# Patient Record
Sex: FEMALE | URBAN - METROPOLITAN AREA
[De-identification: names, ages, dates, MRNs, and addresses within clinical notes are randomized per-mention and may not be internally consistent; named-entity substitution may affect disease eponyms.]

---

## 2024-09-23 ENCOUNTER — APPOINTMENT (OUTPATIENT)
Dept: PRIMARY CARE | Facility: CLINIC | Age: 44
End: 2024-09-23

## 2024-09-23 DIAGNOSIS — Z71.9 HEALTH EDUCATION/COUNSELING: Primary | ICD-10-CM

## 2024-09-23 DIAGNOSIS — Z00.00 HEALTHCARE MAINTENANCE: Primary | ICD-10-CM

## 2024-10-10 ENCOUNTER — LAB (OUTPATIENT)
Dept: LAB | Facility: LAB | Age: 44
End: 2024-10-10
Payer: COMMERCIAL

## 2024-10-10 DIAGNOSIS — Z00.00 HEALTHCARE MAINTENANCE: ICD-10-CM

## 2024-10-10 LAB
25(OH)D3 SERPL-MCNC: 31 NG/ML (ref 30–100)
ALBUMIN SERPL BCP-MCNC: 4.2 G/DL (ref 3.4–5)
ALP SERPL-CCNC: 40 U/L (ref 33–110)
ALT SERPL W P-5'-P-CCNC: 11 U/L (ref 7–45)
ANION GAP SERPL CALC-SCNC: 14 MMOL/L (ref 10–20)
APPEARANCE UR: ABNORMAL
AST SERPL W P-5'-P-CCNC: 13 U/L (ref 9–39)
BACTERIA #/AREA URNS AUTO: ABNORMAL /HPF
BASOPHILS # BLD AUTO: 0.03 X10*3/UL (ref 0–0.1)
BASOPHILS NFR BLD AUTO: 0.8 %
BILIRUB SERPL-MCNC: 0.7 MG/DL (ref 0–1.2)
BILIRUB UR STRIP.AUTO-MCNC: NEGATIVE MG/DL
BUN SERPL-MCNC: 15 MG/DL (ref 6–23)
CALCIUM SERPL-MCNC: 9.1 MG/DL (ref 8.6–10.6)
CHLORIDE SERPL-SCNC: 106 MMOL/L (ref 98–107)
CHOLEST SERPL-MCNC: 226 MG/DL (ref 0–199)
CHOLESTEROL/HDL RATIO: 4.2
CO2 SERPL-SCNC: 27 MMOL/L (ref 21–32)
COLOR UR: ABNORMAL
CREAT SERPL-MCNC: 0.64 MG/DL (ref 0.5–1.05)
CRP SERPL HS-MCNC: 0.8 MG/L
EGFRCR SERPLBLD CKD-EPI 2021: >90 ML/MIN/1.73M*2
EOSINOPHIL # BLD AUTO: 0.14 X10*3/UL (ref 0–0.7)
EOSINOPHIL NFR BLD AUTO: 3.5 %
ERYTHROCYTE [DISTWIDTH] IN BLOOD BY AUTOMATED COUNT: 11.9 % (ref 11.5–14.5)
EST. AVERAGE GLUCOSE BLD GHB EST-MCNC: 85 MG/DL
GLUCOSE SERPL-MCNC: 93 MG/DL (ref 74–99)
GLUCOSE UR STRIP.AUTO-MCNC: NORMAL MG/DL
HBA1C MFR BLD: 4.6 %
HCT VFR BLD AUTO: 41.2 % (ref 36–46)
HDLC SERPL-MCNC: 53.6 MG/DL
HGB BLD-MCNC: 13.7 G/DL (ref 12–16)
HOLD SPECIMEN: NORMAL
IMM GRANULOCYTES # BLD AUTO: 0.01 X10*3/UL (ref 0–0.7)
IMM GRANULOCYTES NFR BLD AUTO: 0.3 % (ref 0–0.9)
KETONES UR STRIP.AUTO-MCNC: NEGATIVE MG/DL
LDLC SERPL CALC-MCNC: 140 MG/DL
LEUKOCYTE ESTERASE UR QL STRIP.AUTO: ABNORMAL
LYMPHOCYTES # BLD AUTO: 1.22 X10*3/UL (ref 1.2–4.8)
LYMPHOCYTES NFR BLD AUTO: 30.8 %
MCH RBC QN AUTO: 30.5 PG (ref 26–34)
MCHC RBC AUTO-ENTMCNC: 33.3 G/DL (ref 32–36)
MCV RBC AUTO: 92 FL (ref 80–100)
MONOCYTES # BLD AUTO: 0.28 X10*3/UL (ref 0.1–1)
MONOCYTES NFR BLD AUTO: 7.1 %
MUCOUS THREADS #/AREA URNS AUTO: ABNORMAL /LPF
NEUTROPHILS # BLD AUTO: 2.28 X10*3/UL (ref 1.2–7.7)
NEUTROPHILS NFR BLD AUTO: 57.5 %
NITRITE UR QL STRIP.AUTO: NEGATIVE
NON HDL CHOLESTEROL: 172 MG/DL (ref 0–149)
NRBC BLD-RTO: 0 /100 WBCS (ref 0–0)
PH UR STRIP.AUTO: 7 [PH]
PLATELET # BLD AUTO: 210 X10*3/UL (ref 150–450)
POTASSIUM SERPL-SCNC: 4.6 MMOL/L (ref 3.5–5.3)
PROT SERPL-MCNC: 6.7 G/DL (ref 6.4–8.2)
PROT UR STRIP.AUTO-MCNC: NEGATIVE MG/DL
RBC # BLD AUTO: 4.49 X10*6/UL (ref 4–5.2)
RBC # UR STRIP.AUTO: ABNORMAL /UL
RBC #/AREA URNS AUTO: ABNORMAL /HPF
SODIUM SERPL-SCNC: 142 MMOL/L (ref 136–145)
SP GR UR STRIP.AUTO: 1.01
SQUAMOUS #/AREA URNS AUTO: ABNORMAL /HPF
TRIGL SERPL-MCNC: 160 MG/DL (ref 0–149)
TSH SERPL-ACNC: 3.44 MIU/L (ref 0.44–3.98)
UROBILINOGEN UR STRIP.AUTO-MCNC: NORMAL MG/DL
VLDL: 32 MG/DL (ref 0–40)
WBC # BLD AUTO: 4 X10*3/UL (ref 4.4–11.3)
WBC #/AREA URNS AUTO: ABNORMAL /HPF

## 2024-10-10 PROCEDURE — 86141 C-REACTIVE PROTEIN HS: CPT

## 2024-10-10 PROCEDURE — 85025 COMPLETE CBC W/AUTO DIFF WBC: CPT

## 2024-10-10 PROCEDURE — 80053 COMPREHEN METABOLIC PANEL: CPT

## 2024-10-10 PROCEDURE — 80061 LIPID PANEL: CPT

## 2024-10-10 PROCEDURE — 84443 ASSAY THYROID STIM HORMONE: CPT

## 2024-10-10 PROCEDURE — 81001 URINALYSIS AUTO W/SCOPE: CPT

## 2024-10-10 PROCEDURE — 82306 VITAMIN D 25 HYDROXY: CPT

## 2024-10-10 PROCEDURE — 36415 COLL VENOUS BLD VENIPUNCTURE: CPT

## 2024-10-10 PROCEDURE — 83036 HEMOGLOBIN GLYCOSYLATED A1C: CPT

## 2024-10-10 PROCEDURE — 87086 URINE CULTURE/COLONY COUNT: CPT

## 2024-10-11 LAB — BACTERIA UR CULT: NORMAL

## 2024-10-14 PROBLEM — F33.41 RECURRENT MAJOR DEPRESSION IN PARTIAL REMISSION (CMS-HCC): Status: ACTIVE | Noted: 2024-10-14

## 2024-10-14 PROBLEM — K90.0 CELIAC DISEASE (HHS-HCC): Status: ACTIVE | Noted: 2024-10-14

## 2024-10-14 RX ORDER — CITALOPRAM 20 MG/1
20 TABLET, FILM COATED ORAL DAILY
COMMUNITY

## 2024-10-14 ASSESSMENT — PROMIS GLOBAL HEALTH SCALE
EMOTIONAL_PROBLEMS: RARELY
RATE_PHYSICAL_HEALTH: FAIR
RATE_GENERAL_HEALTH: FAIR
RATE_AVERAGE_FATIGUE: SEVERE
RATE_MENTAL_HEALTH: VERY GOOD
RATE_QUALITY_OF_LIFE: GOOD
RATE_AVERAGE_PAIN: 4
RATE_SOCIAL_SATISFACTION: VERY GOOD
CARRYOUT_SOCIAL_ACTIVITIES: VERY GOOD
CARRYOUT_PHYSICAL_ACTIVITIES: A LITTLE
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
## 2024-10-15 ENCOUNTER — TELEPHONE (OUTPATIENT)
Dept: PRIMARY CARE | Facility: CLINIC | Age: 44
End: 2024-10-15

## 2024-10-15 ENCOUNTER — APPOINTMENT (OUTPATIENT)
Dept: PRIMARY CARE | Facility: CLINIC | Age: 44
End: 2024-10-15
Payer: COMMERCIAL

## 2024-10-15 VITALS
OXYGEN SATURATION: 97 % | WEIGHT: 163 LBS | SYSTOLIC BLOOD PRESSURE: 104 MMHG | HEIGHT: 63 IN | HEART RATE: 92 BPM | TEMPERATURE: 97.7 F | DIASTOLIC BLOOD PRESSURE: 78 MMHG | BODY MASS INDEX: 28.88 KG/M2

## 2024-10-15 DIAGNOSIS — N95.1 PERIMENOPAUSE: ICD-10-CM

## 2024-10-15 DIAGNOSIS — Z00.00 ENCOUNTER FOR WELLNESS EXAMINATION IN ADULT: Primary | ICD-10-CM

## 2024-10-15 DIAGNOSIS — F33.41 RECURRENT MAJOR DEPRESSION IN PARTIAL REMISSION (CMS-HCC): ICD-10-CM

## 2024-10-15 DIAGNOSIS — E78.5 HYPERLIPIDEMIA, UNSPECIFIED HYPERLIPIDEMIA TYPE: ICD-10-CM

## 2024-10-15 DIAGNOSIS — E55.9 VITAMIN D DEFICIENCY: ICD-10-CM

## 2024-10-15 DIAGNOSIS — K90.41 GLUTEN INTOLERANCE: ICD-10-CM

## 2024-10-15 DIAGNOSIS — G47.419 PRIMARY NARCOLEPSY WITHOUT CATAPLEXY (HHS-HCC): ICD-10-CM

## 2024-10-15 DIAGNOSIS — R53.83 OTHER FATIGUE: ICD-10-CM

## 2024-10-15 DIAGNOSIS — L30.9 ECZEMA, UNSPECIFIED TYPE: ICD-10-CM

## 2024-10-15 RX ORDER — ONDANSETRON 8 MG/1
8 TABLET, ORALLY DISINTEGRATING ORAL EVERY 8 HOURS PRN
Qty: 20 TABLET | Refills: 0 | Status: SHIPPED | OUTPATIENT
Start: 2024-10-15 | End: 2024-10-22

## 2024-10-15 RX ORDER — TRIAMCINOLONE ACETONIDE 55 UG/1
1 SPRAY, METERED NASAL DAILY
COMMUNITY

## 2024-10-15 RX ORDER — BUPROPION HYDROCHLORIDE 150 MG/1
150 TABLET ORAL EVERY MORNING
Qty: 30 TABLET | Refills: 1 | Status: SHIPPED | OUTPATIENT
Start: 2024-10-15 | End: 2024-12-14

## 2024-10-15 RX ORDER — MILK THISTLE 150 MG
2 CAPSULE ORAL DAILY
COMMUNITY

## 2024-10-15 RX ORDER — VIT C/E/ZN/COPPR/LUTEIN/ZEAXAN 250MG-90MG
125 CAPSULE ORAL DAILY
COMMUNITY

## 2024-10-15 RX ORDER — AZELASTINE 1 MG/ML
1 SPRAY, METERED NASAL DAILY
COMMUNITY
Start: 2023-02-24

## 2024-10-15 RX ORDER — OMEPRAZOLE 20 MG/1
20 CAPSULE, DELAYED RELEASE ORAL DAILY
Qty: 30 CAPSULE | Refills: 11 | Status: SHIPPED | OUTPATIENT
Start: 2024-10-15 | End: 2025-10-15

## 2024-10-15 RX ORDER — HYDROXYZINE HYDROCHLORIDE 50 MG/1
50 TABLET, FILM COATED ORAL AS NEEDED
COMMUNITY
Start: 2023-02-24

## 2024-10-15 NOTE — PATIENT INSTRUCTIONS
Commit to at least 10 minutes daily of mild exercise. Walking, lifting etc  Wellbutrin in am  Prilosec daily  Zofran as needed for nausea  I will call dr. Ghotra   Follow up in one month.  My goal is sto see if you feel less brain fog, more energy etc.

## 2024-10-15 NOTE — TELEPHONE ENCOUNTER
Dr. Carol Ghotra [phone]    Patient says sometimes it is hard to reach her by phone so this is her email [email or web address]

## 2024-10-21 NOTE — TELEPHONE ENCOUNTER
Email chain from Psychology.       Gerardo Garcia,    I just finished speaking with Ruthie and received consent to talk with you. I thought I would start with a simple reply to your email and if you would like to talk further, let me know, and we can follow up with a phone call if you like.  I think the most important thing to know is she is a good  of her experience and her history. She has really come a long way in many years of psychotherapy and has learned many good ways to manage her mood and anxiety symptoms and has worked through a considerable amount of her previous trauma.       Here are some things we continue to work on:    Acceptance of what she cannot change or control in life and related to her physical challenges, equanimity     Self compassion practice and being less hard on herself/more gentle toward herself    Her perfectionism and self judgment    Her tendency toward rumination and obsessive thinking habits    Expression of strong emotion, especially the vulnerable feelings of grief and fear which can be covered over by excessive anxiety and anger.    Self-care in many ways      I hope this is helpful to you. Please let me know if you'd like to talk further…    Carol [facility]   Psychologist   Sent from my [place]      On Oct 15, 2024, at 4:57?PM, Chicho Hay <[email or web address]> wrote:  ?   Gerardo Medina,  I'm a  Internist at (was at the Clinic for 26 yrs), and just started here in July. Vivien started to see me and I asked her if I could reach out to you. She feels you have helped her a lot and wanted to get your input. I saw her for the first time today and she has significant fatigue , that as I can tell from records etc, its been for at least 20 years. We had a great visit and I feel its complicated , but I feel a lot of her issues are still coming from the psychological side of things.  Here labs looked fine. I am getting an echo to make sure her symptoms are  not from an underlying heart issue. I don't expect to find anything. Along with checking in with her, here are the instructions I gave her:  Commit to at least 10 minutes daily of mild exercise. Walking, lifting etc  Wellbutrin 150 xl.  in am( I thought this could give her a little energy in the am)  Prilosec daily  Zofran as needed for nausea  I will call dr. Ghotra   Follow up in one month.  My goal is to see if you feel less brain fog, more energy etc.     Please feel free to send me any general thoughts you may have or we can do a brief call if that would be easier. Just trying to get more insight into her hx. She gave me a lot of stressors due to the issue with adopted daughter and stress from 's family etc.      Take care!  Jose

## 2024-10-30 ENCOUNTER — TELEPHONE (OUTPATIENT)
Dept: PRIMARY CARE | Facility: CLINIC | Age: 44
End: 2024-10-30
Payer: COMMERCIAL

## 2024-10-30 DIAGNOSIS — R53.83 OTHER FATIGUE: Primary | ICD-10-CM

## 2024-11-06 ENCOUNTER — HOSPITAL ENCOUNTER (OUTPATIENT)
Dept: CARDIOLOGY | Facility: CLINIC | Age: 44
Discharge: HOME | End: 2024-11-06
Payer: COMMERCIAL

## 2024-11-06 DIAGNOSIS — F33.41 RECURRENT MAJOR DEPRESSION IN PARTIAL REMISSION (CMS-HCC): ICD-10-CM

## 2024-11-06 DIAGNOSIS — R53.83 OTHER FATIGUE: ICD-10-CM

## 2024-11-06 LAB
AORTIC VALVE PEAK VELOCITY: 1.46 M/S
AV PEAK GRADIENT: 9 MMHG
AVA (PEAK VEL): 2.8 CM2
EJECTION FRACTION APICAL 4 CHAMBER: 64.4
EJECTION FRACTION: 65 %
LEFT ATRIUM VOLUME AREA LENGTH INDEX BSA: 15.4 ML/M2
LEFT VENTRICLE INTERNAL DIMENSION DIASTOLE: 4.29 CM (ref 3.5–6)
LEFT VENTRICULAR OUTFLOW TRACT DIAMETER: 2.07 CM
MITRAL VALVE E/A RATIO: 0.67
RIGHT VENTRICLE FREE WALL PEAK S': 15 CM/S
RIGHT VENTRICLE PEAK SYSTOLIC PRESSURE: 23.3 MMHG
TRICUSPID ANNULAR PLANE SYSTOLIC EXCURSION: 2 CM

## 2024-11-06 PROCEDURE — 93306 TTE W/DOPPLER COMPLETE: CPT | Performed by: INTERNAL MEDICINE

## 2024-11-06 PROCEDURE — 93306 TTE W/DOPPLER COMPLETE: CPT

## 2024-11-06 RX ORDER — BUPROPION HYDROCHLORIDE 150 MG/1
150 TABLET ORAL EVERY MORNING
Qty: 90 TABLET | Refills: 1 | Status: SHIPPED | OUTPATIENT
Start: 2024-11-06 | End: 2025-05-05

## 2024-11-15 ENCOUNTER — APPOINTMENT (OUTPATIENT)
Dept: PRIMARY CARE | Facility: CLINIC | Age: 44
End: 2024-11-15
Payer: COMMERCIAL

## 2024-11-15 VITALS
OXYGEN SATURATION: 97 % | HEART RATE: 89 BPM | TEMPERATURE: 98.1 F | DIASTOLIC BLOOD PRESSURE: 62 MMHG | SYSTOLIC BLOOD PRESSURE: 98 MMHG

## 2024-11-15 DIAGNOSIS — R53.83 OTHER FATIGUE: ICD-10-CM

## 2024-11-15 DIAGNOSIS — R11.0 NAUSEA: Primary | ICD-10-CM

## 2024-11-15 DIAGNOSIS — F33.41 RECURRENT MAJOR DEPRESSION IN PARTIAL REMISSION (CMS-HCC): ICD-10-CM

## 2024-11-15 PROCEDURE — 99204 OFFICE O/P NEW MOD 45 MIN: CPT | Performed by: INTERNAL MEDICINE

## 2024-11-15 RX ORDER — BUPROPION HYDROCHLORIDE 300 MG/1
300 TABLET ORAL EVERY MORNING
Qty: 90 TABLET | Refills: 3 | Status: SHIPPED | OUTPATIENT
Start: 2024-11-15 | End: 2025-11-10

## 2024-11-15 NOTE — PROGRESS NOTES
Subjective   Patient ID: Vivien Sheehan is a 44 y.o. female who presents for Follow-up.    HPI   Fu overwhelming fatigue.   I feel patient has multiple reasons for this chronic fatigue. I feel that she has had years of moderately treated depression with no changes in meds etc.   Commit to at least 10 minutes daily of mild exercise. Walking, lifting etc. She has started walking with  and walking dogs. Much better than where it was. Doing yoga.   Wellbutrin in am 150mg. Started at last vsiit. No anxiousness with the med. Election bothered her. Has been on celexa. On the whole feels she may have a little more energy, ex. Went to [employer] and walked [place] hill and kept going for 45 min to an hour. Went to dinner that night.   Feels major stressors on her body will cause the fatigue. Covid, listeria, holiday joy last year.   Prilosec daily  Zofran as needed for nausea gives her constipation. Nausea daily. Night after dinner. Feels its worse since bad thicknesses. Nausea 20 yrs.   I will call dr. Ghotra   Follow up in one month.  My goal is sto see if you feel less brain fog, more energy etc.  7. Has womens health visit.   - Transthoracic Echo (TTE) Complete; Future    Dr ghotra responded with:  Here are some things we continue to work on:    Acceptance of what she cannot change or control in life and related to her physical challenges, equanimity     Self compassion practice and being less hard on herself/more gentle toward herself    Her perfectionism and self judgment    Her tendency toward rumination and obsessive thinking habits    Expression of strong emotion, especially the vulnerable feelings of grief and fear which can be covered over by excessive anxiety and anger.    Self-care in many ways      Review of Systems    Objective   BP 98/62 (BP Location: Right arm, Patient Position: Sitting)   Pulse 89   Temp 36.7 °C (98.1 °F)   SpO2 97%     Physical Exam    Assessment/Plan   1. Nausea  (Primary)  Chronic issue. Would like to see gi. May need scope. Will check stool for h pylori.   - H. pylori antigen, stool; Future  - Referral to Gastroenterology; Future    2. Recurrent major depression in partial remission (CMS-HCC)  Will increase the wellbutrin to 300 and continue celexa. We will continue to look for improvements in fatigue, stamina and day to day struggles she has had. Appreciated input from her psychologist , who feels that she     3. Other fatigue  Ties in with above.

## 2024-12-11 ENCOUNTER — APPOINTMENT (OUTPATIENT)
Dept: CARDIOLOGY | Facility: HOSPITAL | Age: 44
End: 2024-12-11
Payer: COMMERCIAL

## 2024-12-11 ENCOUNTER — HOSPITAL ENCOUNTER (EMERGENCY)
Facility: HOSPITAL | Age: 44
Discharge: HOME | End: 2024-12-11
Attending: EMERGENCY MEDICINE
Payer: COMMERCIAL

## 2024-12-11 ENCOUNTER — APPOINTMENT (OUTPATIENT)
Dept: RADIOLOGY | Facility: HOSPITAL | Age: 44
End: 2024-12-11
Payer: COMMERCIAL

## 2024-12-11 VITALS
WEIGHT: 150 LBS | SYSTOLIC BLOOD PRESSURE: 119 MMHG | HEART RATE: 86 BPM | DIASTOLIC BLOOD PRESSURE: 83 MMHG | TEMPERATURE: 98.1 F | BODY MASS INDEX: 27.6 KG/M2 | HEIGHT: 62 IN | OXYGEN SATURATION: 98 % | RESPIRATION RATE: 18 BRPM

## 2024-12-11 DIAGNOSIS — J98.01 ACUTE BRONCHOSPASM: Primary | ICD-10-CM

## 2024-12-11 LAB
ALBUMIN SERPL BCP-MCNC: 3.8 G/DL (ref 3.4–5)
ALP SERPL-CCNC: 49 U/L (ref 33–110)
ALT SERPL W P-5'-P-CCNC: 10 U/L (ref 7–45)
ANION GAP SERPL CALC-SCNC: 12 MMOL/L (ref 10–20)
AST SERPL W P-5'-P-CCNC: 13 U/L (ref 9–39)
BASOPHILS # BLD AUTO: 0.02 X10*3/UL (ref 0–0.1)
BASOPHILS NFR BLD AUTO: 0.4 %
BILIRUB SERPL-MCNC: 0.4 MG/DL (ref 0–1.2)
BUN SERPL-MCNC: 16 MG/DL (ref 6–23)
CALCIUM SERPL-MCNC: 8.2 MG/DL (ref 8.6–10.3)
CARDIAC TROPONIN I PNL SERPL HS: <3 NG/L (ref 0–13)
CHLORIDE SERPL-SCNC: 104 MMOL/L (ref 98–107)
CO2 SERPL-SCNC: 23 MMOL/L (ref 21–32)
CREAT SERPL-MCNC: 0.73 MG/DL (ref 0.5–1.05)
EGFRCR SERPLBLD CKD-EPI 2021: >90 ML/MIN/1.73M*2
EOSINOPHIL # BLD AUTO: 0.06 X10*3/UL (ref 0–0.7)
EOSINOPHIL NFR BLD AUTO: 1.1 %
ERYTHROCYTE [DISTWIDTH] IN BLOOD BY AUTOMATED COUNT: 12.4 % (ref 11.5–14.5)
GLUCOSE SERPL-MCNC: 101 MG/DL (ref 74–99)
HCT VFR BLD AUTO: 36 % (ref 36–46)
HGB BLD-MCNC: 12.1 G/DL (ref 12–16)
IMM GRANULOCYTES # BLD AUTO: 0.02 X10*3/UL (ref 0–0.7)
IMM GRANULOCYTES NFR BLD AUTO: 0.4 % (ref 0–0.9)
LYMPHOCYTES # BLD AUTO: 0.41 X10*3/UL (ref 1.2–4.8)
LYMPHOCYTES NFR BLD AUTO: 7.4 %
MCH RBC QN AUTO: 29.8 PG (ref 26–34)
MCHC RBC AUTO-ENTMCNC: 33.6 G/DL (ref 32–36)
MCV RBC AUTO: 89 FL (ref 80–100)
MONOCYTES # BLD AUTO: 0.54 X10*3/UL (ref 0.1–1)
MONOCYTES NFR BLD AUTO: 9.7 %
NEUTROPHILS # BLD AUTO: 4.49 X10*3/UL (ref 1.2–7.7)
NEUTROPHILS NFR BLD AUTO: 81 %
NRBC BLD-RTO: 0 /100 WBCS (ref 0–0)
PLATELET # BLD AUTO: 179 X10*3/UL (ref 150–450)
POTASSIUM SERPL-SCNC: 3.5 MMOL/L (ref 3.5–5.3)
PROT SERPL-MCNC: 6.1 G/DL (ref 6.4–8.2)
RBC # BLD AUTO: 4.06 X10*6/UL (ref 4–5.2)
S PYO DNA THROAT QL NAA+PROBE: NOT DETECTED
SARS-COV-2 RNA RESP QL NAA+PROBE: NOT DETECTED
SODIUM SERPL-SCNC: 135 MMOL/L (ref 136–145)
WBC # BLD AUTO: 5.5 X10*3/UL (ref 4.4–11.3)

## 2024-12-11 PROCEDURE — 96374 THER/PROPH/DIAG INJ IV PUSH: CPT

## 2024-12-11 PROCEDURE — 87635 SARS-COV-2 COVID-19 AMP PRB: CPT | Performed by: PHYSICIAN ASSISTANT

## 2024-12-11 PROCEDURE — 84075 ASSAY ALKALINE PHOSPHATASE: CPT | Performed by: PHYSICIAN ASSISTANT

## 2024-12-11 PROCEDURE — 85025 COMPLETE CBC W/AUTO DIFF WBC: CPT | Performed by: PHYSICIAN ASSISTANT

## 2024-12-11 PROCEDURE — 36415 COLL VENOUS BLD VENIPUNCTURE: CPT | Performed by: PHYSICIAN ASSISTANT

## 2024-12-11 PROCEDURE — 71275 CT ANGIOGRAPHY CHEST: CPT

## 2024-12-11 PROCEDURE — 2500000002 HC RX 250 W HCPCS SELF ADMINISTERED DRUGS (ALT 637 FOR MEDICARE OP, ALT 636 FOR OP/ED): Performed by: PHYSICIAN ASSISTANT

## 2024-12-11 PROCEDURE — 70491 CT SOFT TISSUE NECK W/DYE: CPT

## 2024-12-11 PROCEDURE — 87651 STREP A DNA AMP PROBE: CPT | Performed by: PHYSICIAN ASSISTANT

## 2024-12-11 PROCEDURE — 70491 CT SOFT TISSUE NECK W/DYE: CPT | Performed by: RADIOLOGY

## 2024-12-11 PROCEDURE — 94640 AIRWAY INHALATION TREATMENT: CPT

## 2024-12-11 PROCEDURE — 2550000001 HC RX 255 CONTRASTS: Performed by: PHYSICIAN ASSISTANT

## 2024-12-11 PROCEDURE — 71275 CT ANGIOGRAPHY CHEST: CPT | Performed by: RADIOLOGY

## 2024-12-11 PROCEDURE — 84484 ASSAY OF TROPONIN QUANT: CPT | Performed by: PHYSICIAN ASSISTANT

## 2024-12-11 PROCEDURE — 2500000004 HC RX 250 GENERAL PHARMACY W/ HCPCS (ALT 636 FOR OP/ED): Performed by: PHYSICIAN ASSISTANT

## 2024-12-11 PROCEDURE — 99285 EMERGENCY DEPT VISIT HI MDM: CPT | Mod: 25 | Performed by: EMERGENCY MEDICINE

## 2024-12-11 PROCEDURE — 93005 ELECTROCARDIOGRAM TRACING: CPT

## 2024-12-11 RX ORDER — BENZONATATE 200 MG/1
200 CAPSULE ORAL 3 TIMES DAILY PRN
Qty: 20 CAPSULE | Refills: 0 | Status: SHIPPED | OUTPATIENT
Start: 2024-12-11 | End: 2024-12-18

## 2024-12-11 RX ORDER — DEXAMETHASONE SODIUM PHOSPHATE 10 MG/ML
10 INJECTION INTRAMUSCULAR; INTRAVENOUS ONCE
Status: COMPLETED | OUTPATIENT
Start: 2024-12-11 | End: 2024-12-11

## 2024-12-11 RX ORDER — IPRATROPIUM BROMIDE AND ALBUTEROL SULFATE 2.5; .5 MG/3ML; MG/3ML
3 SOLUTION RESPIRATORY (INHALATION) ONCE
Status: COMPLETED | OUTPATIENT
Start: 2024-12-11 | End: 2024-12-11

## 2024-12-11 RX ORDER — ALBUTEROL SULFATE 90 UG/1
2 INHALANT RESPIRATORY (INHALATION) EVERY 4 HOURS PRN
Qty: 18 G | Refills: 0 | Status: SHIPPED | OUTPATIENT
Start: 2024-12-11 | End: 2025-01-10

## 2024-12-11 ASSESSMENT — COLUMBIA-SUICIDE SEVERITY RATING SCALE - C-SSRS
2. HAVE YOU ACTUALLY HAD ANY THOUGHTS OF KILLING YOURSELF?: NO
6. HAVE YOU EVER DONE ANYTHING, STARTED TO DO ANYTHING, OR PREPARED TO DO ANYTHING TO END YOUR LIFE?: NO
1. IN THE PAST MONTH, HAVE YOU WISHED YOU WERE DEAD OR WISHED YOU COULD GO TO SLEEP AND NOT WAKE UP?: NO

## 2024-12-11 ASSESSMENT — PAIN DESCRIPTION - LOCATION: LOCATION: THROAT

## 2024-12-11 ASSESSMENT — PAIN - FUNCTIONAL ASSESSMENT: PAIN_FUNCTIONAL_ASSESSMENT: 0-10

## 2024-12-11 ASSESSMENT — PAIN SCALES - GENERAL: PAINLEVEL_OUTOF10: 5 - MODERATE PAIN

## 2024-12-11 NOTE — ED PROVIDER NOTES
HPI   Chief Complaint   Patient presents with    Cough     Choked on saliva last night and has had severe cough through today. Hx of asthma as teenager and difficulty swallowing x2 weeks. Pt a/ox4 VSS. Managing secretions        44y old female with a complaint of choking on her spit and coughing. Nothing makes her better or worse, no fever or or chills. Has some myalgias from frequently coughing. Admits she is on wellbutrin and questions if the medication is causing her to cough. Had asthma as a child but not since. States she has no chest or pleuritic pain. Doesn't feel like she became ill.               Patient History   History reviewed. No pertinent past medical history.  Past Surgical History:   Procedure Laterality Date    BREAST BIOPSY N/A 2012    benign    COLPOSCOPY  2003    KNEE  1998     Family History   Problem Relation Name Age of Onset    Breast cancer Mother      Endometriosis Sister      Breast cancer Maternal Grandmother      Hypertension Maternal Grandfather      Ovarian cancer Paternal Grandmother      Pancreatic cancer Father's Brother       Social History     Tobacco Use    Smoking status: Never    Smokeless tobacco: Never   Substance Use Topics    Alcohol use: Yes     Alcohol/week: 2.0 standard drinks of alcohol     Types: 2 Standard drinks or equivalent per week    Drug use: Not on file       Physical Exam   ED Triage Vitals   Temperature Heart Rate Respirations BP   12/11/24 0012 12/11/24 0012 12/11/24 0012 12/11/24 0012   36.7 °C (98.1 °F) 96 20 128/77      Pulse Ox Temp Source Heart Rate Source Patient Position   12/11/24 0012 12/11/24 0012 12/11/24 0039 12/11/24 0012   98 % Temporal Monitor Sitting      BP Location FiO2 (%)     12/11/24 0012 --     Left arm        Physical Exam  Vitals and nursing note reviewed.   Constitutional:       General: She is not in acute distress.     Appearance: Normal appearance. She is normal weight. She is not ill-appearing, toxic-appearing or diaphoretic.    HENT:      Head: Normocephalic and atraumatic.      Right Ear: External ear normal.      Left Ear: External ear normal.      Nose: Nose normal.      Mouth/Throat:      Mouth: Mucous membranes are moist.   Eyes:      Extraocular Movements: Extraocular movements intact.      Conjunctiva/sclera: Conjunctivae normal.      Pupils: Pupils are equal, round, and reactive to light.   Cardiovascular:      Rate and Rhythm: Normal rate and regular rhythm.      Heart sounds: No murmur heard.  Pulmonary:      Effort: Pulmonary effort is normal. No respiratory distress.      Breath sounds: No stridor. No wheezing, rhonchi or rales.   Abdominal:      General: There is no distension.   Musculoskeletal:         General: No swelling, tenderness, deformity or signs of injury.      Cervical back: Normal range of motion. No rigidity or tenderness.   Skin:     General: Skin is warm.      Capillary Refill: Capillary refill takes less than 2 seconds.      Coloration: Skin is not jaundiced.      Findings: No bruising or rash.   Neurological:      General: No focal deficit present.      Mental Status: She is alert and oriented to person, place, and time. Mental status is at baseline.   Psychiatric:         Mood and Affect: Mood normal.         Behavior: Behavior normal.         Thought Content: Thought content normal.         Judgment: Judgment normal.           ED Course & MDM   ED Course as of 12/11/24 0406   Wed Dec 11, 2024   0201 EKG interpreted by myself.  Normal sinus rhythm at a rate of 89 bpm.  Normal intervals.  Normal axis.  No signs of acute ischemia.   [EF]      ED Course User Index  [EF] Jake Maier MD         Diagnoses as of 12/11/24 0406   Acute bronchospasm                 No data recorded     Megan Coma Scale Score: 15 (12/11/24 0013 : Darryl Ruiz RN)                           Medical Decision Making  MEDICAL DECISION MAKING   Number and Complexity of Problems  Differential Diagnosis: bronchospasm, covid, acs,  pe, retropharyngeal abscess    MDM Data  External documents reviewed: message to pcp questioning if bronchospasm fromp  Discussed with: ed attending    Treatment and Disposition  Labs Reviewed  CBC WITH AUTO DIFFERENTIAL - Abnormal     WBC                           5.5                    nRBC                          0.0                    RBC                           4.06                   Hemoglobin                    12.1                   Hematocrit                    36.0                   MCV                           89                     MCH                           29.8                   MCHC                          33.6                   RDW                           12.4                   Platelets                     179                    Neutrophils %                 81.0                   Immature Granulocytes %, Automated   0.4                    Lymphocytes %                 7.4                    Monocytes %                   9.7                    Eosinophils %                 1.1                    Basophils %                   0.4                    Neutrophils Absolute          4.49                   Immature Granulocytes Absolute, Au*   0.02                   Lymphocytes Absolute          0.41 (*)               Monocytes Absolute            0.54                   Eosinophils Absolute          0.06                   Basophils Absolute            0.02                COMPREHENSIVE METABOLIC PANEL - Abnormal     Glucose                       101 (*)                Sodium                        135 (*)                Potassium                     3.5                    Chloride                      104                    Bicarbonate                   23                     Anion Gap                     12                     Urea Nitrogen                 16                     Creatinine                    0.73                   eGFR                          >90                    Calcium                        8.2 (*)                Albumin                       3.8                    Alkaline Phosphatase          49                     Total Protein                 6.1 (*)                AST                           13                     Bilirubin, Total              0.4                    ALT                           10                  GROUP A STREPTOCOCCUS, PCR - Normal     Group A Strep PCR                                 SARS-COV-2 PCR - Normal     Coronavirus 2019, PCR                                  Narrative: This assay has received FDA Emergency Use Authorization (EUA) and is only authorized for the duration of time that circumstances exist to justify the authorization of the emergency use of in vitro diagnostic tests for the detection of SARS-CoV-2 virus and/or diagnosis of COVID-19 infection under section 564(b)(1) of the Act, 21 U.S.C. 360bbb-3(b)(1). This assay is an in vitro diagnostic nucleic acid amplification test for the qualitative detection of SARS-CoV-2 from nasopharyngeal specimens and has been validated for use at [facility]. Negative results do not preclude COVID-19 infections and should not be used as the sole basis for diagnosis, treatment, or other management decisions.                  TROPONIN I, HIGH SENSITIVITY - Normal   CT angio chest for pulmonary embolism   Final Result    1. No evidence of pulmonary emboli.    2. No acute findings at the soft tissues of the neck.                MACRO:    None.          Signed by: Geovanna Asencio 12/11/2024 3:31 AM    Dictation workstation:   [id number]     CT soft tissue neck w IV contrast   Final Result    1. No evidence of pulmonary emboli.    2. No acute findings at the soft tissues of the neck.                MACRO:    None.          Signed by: Geovanna Asencio 12/11/2024 3:31 AM    Dictation workstation:   [id number]      Medications  dexAMETHasone (PF) (Decadron) injection 10 mg (10 mg intravenous  Given 12/11/24 0141)  ipratropium-albuteroL (Duo-Neb) 0.5-2.5 mg/3 mL nebulizer solution 3 mL (3 mL nebulization Given 12/11/24 0134)  iohexol (OMNIPaque) 350 mg iodine/mL solution 75 mL (75 mL intravenous Given 12/11/24 0250)     ED Course: improved with steroid and neb, will dc with reassurance and inhaler.   Was admission considered?: yes but nml workup, doesn't require escalation of care.        Amount and/or Complexity of Data Reviewed  ECG/medicine tests: independent interpretation performed.     Details: 89 bpm nsr no st elevation or depression        Procedure  Procedures     Simone Navarro PA-C  12/11/24 7940

## 2024-12-11 NOTE — ED NOTES
Discharge instructions reviewed with patient at this time. Pt instructed to follow up with PCP. Pt instructed to  prescriptions at pharmacy and take them as prescribed. Pt ambulated out of department with steady gait.      Gabrielle Spatz, RN  12/11/24 8028

## 2024-12-11 NOTE — ED TRIAGE NOTES
Choked on saliva last night and has had severe cough through today. Hx of asthma as teenager and difficulty swallowing x2 weeks. Pt a/ox4 VSS. Managing secretions

## 2024-12-11 NOTE — DISCHARGE INSTRUCTIONS
Discuss with your doctor if wellbutrin should be continued or not. 2-4% can have bronchospasm/ cough/ bronchitis.

## 2024-12-12 ENCOUNTER — TELEPHONE (OUTPATIENT)
Dept: PRIMARY CARE | Facility: CLINIC | Age: 44
End: 2024-12-12
Payer: COMMERCIAL

## 2024-12-12 DIAGNOSIS — J06.9 UPPER RESPIRATORY TRACT INFECTION, UNSPECIFIED TYPE: Primary | ICD-10-CM

## 2024-12-12 RX ORDER — AZITHROMYCIN 250 MG/1
TABLET, FILM COATED ORAL
Qty: 6 TABLET | Refills: 0 | Status: SHIPPED | OUTPATIENT
Start: 2024-12-12 | End: 2024-12-17

## 2024-12-12 RX ORDER — PREDNISONE 20 MG/1
40 TABLET ORAL DAILY
Qty: 10 TABLET | Refills: 0 | Status: SHIPPED | OUTPATIENT
Start: 2024-12-12 | End: 2024-12-17

## 2024-12-12 NOTE — TELEPHONE ENCOUNTER
Patient's  called and stated the patient was in the ER Tuesday with a severe cough that was causing her to have shortness of breath.  They prescribed Tessalon and albuterol and she received an IV steroid.    She was negative for covid and strep.    Patient is still not feeling good.  Cough is still pretty bad but not to the point where she can't breathe like before and there is some yellowish phlegm. Temp is running in the high 99s. And she also has body aches.     Please advise.

## 2024-12-15 LAB
ATRIAL RATE: 89 BPM
P AXIS: 63 DEGREES
P OFFSET: 206 MS
P ONSET: 154 MS
PR INTERVAL: 130 MS
Q ONSET: 219 MS
QRS COUNT: 15 BEATS
QRS DURATION: 86 MS
QT INTERVAL: 360 MS
QTC CALCULATION(BAZETT): 438 MS
QTC FREDERICIA: 410 MS
R AXIS: 23 DEGREES
T AXIS: 51 DEGREES
T OFFSET: 399 MS
VENTRICULAR RATE: 89 BPM

## 2025-01-15 ENCOUNTER — LAB (OUTPATIENT)
Dept: LAB | Facility: LAB | Age: 45
End: 2025-01-15
Payer: COMMERCIAL

## 2025-01-15 DIAGNOSIS — R11.0 NAUSEA: ICD-10-CM

## 2025-01-15 PROCEDURE — 87449 NOS EACH ORGANISM AG IA: CPT

## 2025-01-16 ENCOUNTER — APPOINTMENT (OUTPATIENT)
Dept: RADIOLOGY | Facility: HOSPITAL | Age: 45
End: 2025-01-16
Payer: COMMERCIAL

## 2025-01-16 ENCOUNTER — CLINICAL SUPPORT (OUTPATIENT)
Dept: URGENT CARE | Age: 45
End: 2025-01-16
Payer: COMMERCIAL

## 2025-01-16 ENCOUNTER — HOSPITAL ENCOUNTER (EMERGENCY)
Facility: HOSPITAL | Age: 45
Discharge: HOME | End: 2025-01-16
Attending: EMERGENCY MEDICINE
Payer: COMMERCIAL

## 2025-01-16 VITALS
DIASTOLIC BLOOD PRESSURE: 87 MMHG | SYSTOLIC BLOOD PRESSURE: 103 MMHG | HEART RATE: 89 BPM | RESPIRATION RATE: 15 BRPM | TEMPERATURE: 97.9 F | OXYGEN SATURATION: 100 %

## 2025-01-16 DIAGNOSIS — S09.90XA INJURY OF HEAD, INITIAL ENCOUNTER: Primary | ICD-10-CM

## 2025-01-16 PROCEDURE — 99284 EMERGENCY DEPT VISIT MOD MDM: CPT | Mod: 25 | Performed by: EMERGENCY MEDICINE

## 2025-01-16 PROCEDURE — 70450 CT HEAD/BRAIN W/O DYE: CPT | Performed by: RADIOLOGY

## 2025-01-16 PROCEDURE — 2500000002 HC RX 250 W HCPCS SELF ADMINISTERED DRUGS (ALT 637 FOR MEDICARE OP, ALT 636 FOR OP/ED)

## 2025-01-16 PROCEDURE — 2500000004 HC RX 250 GENERAL PHARMACY W/ HCPCS (ALT 636 FOR OP/ED)

## 2025-01-16 PROCEDURE — 72125 CT NECK SPINE W/O DYE: CPT | Performed by: RADIOLOGY

## 2025-01-16 PROCEDURE — 70450 CT HEAD/BRAIN W/O DYE: CPT

## 2025-01-16 PROCEDURE — 96372 THER/PROPH/DIAG INJ SC/IM: CPT

## 2025-01-16 PROCEDURE — 2500000001 HC RX 250 WO HCPCS SELF ADMINISTERED DRUGS (ALT 637 FOR MEDICARE OP)

## 2025-01-16 PROCEDURE — 72125 CT NECK SPINE W/O DYE: CPT

## 2025-01-16 RX ORDER — MECLIZINE HYDROCHLORIDE 25 MG/1
25 TABLET ORAL 3 TIMES DAILY PRN
Qty: 9 TABLET | Refills: 0 | Status: SHIPPED | OUTPATIENT
Start: 2025-01-16 | End: 2025-01-19

## 2025-01-16 RX ORDER — KETOROLAC TROMETHAMINE 30 MG/ML
15 INJECTION, SOLUTION INTRAMUSCULAR; INTRAVENOUS ONCE
Status: COMPLETED | OUTPATIENT
Start: 2025-01-16 | End: 2025-01-16

## 2025-01-16 RX ORDER — ONDANSETRON 4 MG/1
4 TABLET, ORALLY DISINTEGRATING ORAL ONCE
Status: COMPLETED | OUTPATIENT
Start: 2025-01-16 | End: 2025-01-16

## 2025-01-16 RX ORDER — ACETAMINOPHEN 325 MG/1
975 TABLET ORAL ONCE
Status: COMPLETED | OUTPATIENT
Start: 2025-01-16 | End: 2025-01-16

## 2025-01-16 RX ORDER — MECLIZINE HYDROCHLORIDE 25 MG/1
25 TABLET ORAL 3 TIMES DAILY PRN
Qty: 9 TABLET | Refills: 0 | Status: SHIPPED | OUTPATIENT
Start: 2025-01-16 | End: 2025-01-16

## 2025-01-16 RX ORDER — MECLIZINE HYDROCHLORIDE 25 MG/1
25 TABLET ORAL ONCE
Status: COMPLETED | OUTPATIENT
Start: 2025-01-16 | End: 2025-01-16

## 2025-01-16 RX ADMIN — ACETAMINOPHEN 975 MG: 325 TABLET, FILM COATED ORAL at 10:49

## 2025-01-16 RX ADMIN — ONDANSETRON 4 MG: 4 TABLET, ORALLY DISINTEGRATING ORAL at 10:49

## 2025-01-16 RX ADMIN — MECLIZINE HYDROCHLORIDE 25 MG: 25 TABLET ORAL at 10:49

## 2025-01-16 RX ADMIN — KETOROLAC TROMETHAMINE 15 MG: 30 INJECTION, SOLUTION INTRAMUSCULAR at 11:16

## 2025-01-16 ASSESSMENT — PAIN SCALES - GENERAL
PAINLEVEL_OUTOF10: 7
PAINLEVEL_OUTOF10: 0 - NO PAIN
PAINLEVEL_OUTOF10: 6
PAINLEVEL_OUTOF10: 3

## 2025-01-16 ASSESSMENT — PAIN DESCRIPTION - ONSET: ONSET: ONGOING

## 2025-01-16 ASSESSMENT — PAIN DESCRIPTION - PROGRESSION: CLINICAL_PROGRESSION: GRADUALLY WORSENING

## 2025-01-16 ASSESSMENT — COLUMBIA-SUICIDE SEVERITY RATING SCALE - C-SSRS
1. IN THE PAST MONTH, HAVE YOU WISHED YOU WERE DEAD OR WISHED YOU COULD GO TO SLEEP AND NOT WAKE UP?: NO
6. HAVE YOU EVER DONE ANYTHING, STARTED TO DO ANYTHING, OR PREPARED TO DO ANYTHING TO END YOUR LIFE?: NO
2. HAVE YOU ACTUALLY HAD ANY THOUGHTS OF KILLING YOURSELF?: NO

## 2025-01-16 ASSESSMENT — PAIN - FUNCTIONAL ASSESSMENT: PAIN_FUNCTIONAL_ASSESSMENT: 0-10

## 2025-01-16 ASSESSMENT — PAIN DESCRIPTION - LOCATION: LOCATION: HEAD

## 2025-01-16 NOTE — ED PROVIDER NOTES
HPI   Chief Complaint   Patient presents with    Headache     Hit head on door frame        44-year-old female presents to the ED today with a chief concern of dizziness.  Patient reports that 2 days ago she slipped and fell and hit her head on the side of her car.  Did not lose consciousness.  Is not anticoagulated.  Reports that since then she has had headache that is been gradually worsening.  She reports that she also started feeling little bit dizzy this morning.  The dizziness feels more of the room is spinning.  She denies any lightheadedness.  She denies any further falls.  She denies fevers.  She reports nausea but no vomiting.  No chest pain or shortness of breath.  Reports some mild neck pain.  No numbness or tingling or weakness.  No vision changes.  Saddle anesthesia or urinary/fecal incontinence or retention.  She has no other symptoms or concerns at this time.      History provided by:  Patient   used: No            Patient History   No past medical history on file.  No past surgical history on file.  No family history on file.  Social History     Tobacco Use    Smoking status: Not on file    Smokeless tobacco: Not on file   Substance Use Topics    Alcohol use: Not on file    Drug use: Not on file       Physical Exam   ED Triage Vitals [01/16/25 0913]   Temperature Heart Rate Respirations BP   36.6 °C (97.9 °F) 89 15 103/87      Pulse Ox Temp src Heart Rate Source Patient Position   100 % -- -- --      BP Location FiO2 (%)     -- --       Physical Exam  General: The patient is sitting comfortably no acute distress.  Vital signs per nursing note.  Skin: No rashes, lesions, scars.  Normal skin turgor.  HEENT: The head is atraumatic normocephalic.  The neck is supple.  The trachea is midline. No tenderness to palpation of the head.  Eyelashes and eyebrows are of normal quantity, distribution, color, and position bilaterally without lesions.  No enophthalmos or exophthalmos.  PERRL,  EOMI without nystagmus.  Negative raccoon eyes. External ear anatomy is normal.  TMs are white/gray and translucent.  Light reflex and bony landmarks are present.  No erythema, bulging, or retraction of the TM.  Negative hernandez sign.  Hearing is grossly intact.  No epistaxis or rhinorrhea.  Lips and buccal mucosa are pink and moist without lesions.  Tongue is midline without lesions.  Uvula is midline with symmetric elevation of the soft palate.  Normal phonation.  No hoarseness.  No muffled voice.  Lungs: Lungs are clear to auscultation bilaterally.  No rhonchi, wheezing, or rales.  No stridor.  Symmetric chest expansion  Heart: Normal S1-S2 no murmurs, rubs, gallops.  Abdomen: Abdomen is flat, nontender, nondistended.  No rebound tenderness or guarding.  No pulsatile mass.  No CVA tenderness.  Peripheral vascular: Symmetric 2+ radial and dorsalis pedis pulses.   Neurologic: Alert and oriented x4.  Thought process is coherent.  5/5 strength in the upper and lower extremity.  Sensation is intact in the upper and lower extremity.  Normal gait.  Negative Romberg and pronator drift.  Rapid alternating motor movements are intact.  NIH 0.  Mild gait instability.  Negative test of skew.  Musculoskeletal: No overlying skin changes throughout the entire back.  No C, T, L spine tenderness. Full ROM of the neck and back.   : Deferred    ED Course & MDM   ED Course as of 01/16/25 1900   u Jan 16, 2025   1043 IMPRESSION:  No evidence of an acute fracture. Degenerative changes.      MACRO:  None.      Signed by: Murtaza Puentes 1/16/2025 10:13 AM  Dictation workstation:   [id number]   []   1043 IMPRESSION:  No evidence of an acute intracranial process.      MACRO:  None.      Signed by: Murtaza Puentes 1/16/2025 10:11 AM  Dictation workstation:   [id number]           []   1104 Denies any chance of pregnancy []   1141 On reevaluation patient is feeling much better []      ED Course User Index  [] Jorge Luis Best PA-C          Diagnoses as of 01/16/25 1900   Injury of head, initial encounter                 No data recorded     [place] Coma Scale Score: 15 (01/16/25 0915 : Vicky Butler RN)                           Medical Decision Making  44-year-old female presents to the ED today with a chief concern of fall.  Vital signs reassuring.  Patient overall appears well and is nontoxic-appearing.  She has a ride home today will not be driving her self home.  Was given Tylenol, meclizine, Zofran in the ED.  Was also given Toradol. Patient is fully neurologically intact with no acute neurological deficits.  Her NIH is 0.  Her CT head is unremarkable.  Her CT C-spine shows no evidence of fracture.  Degenerative changes noted.  She has no signs of compartment syndrome.  No emergent MRI indicated at this time.  Was given Zofran, meclizine, Tylenol, and Toradol in the ED.  She felt much better after administration of these medications and was asymptomatic prior to discharge.  Symptoms likely related to concussion.  I did refer her to the concussion clinic.  I have low suspicion for any stroke at this time.  No acute neurological deficits on exam.  The injury was mechanical.  No symptoms prior to the injury.  Is freely moving all extremities without any difficulty.  Low suspicion for SAH.  Do not think further workup with LP is indicated at this time.  Discussed my impression and findings with patient she feels comfortable returning home.  We discussed very strict precautions including returning for any new or worsening signs or symptoms.  Patient is in agreement with this plan.  She will follow-up with her PCP and concussion specialist within 3 days.    Differential diagnosis: Concussion, TBI, ICH, SAH    Disposition/treatment  1.  See diagnosis    Shared decision-making was used patient feels comfortable returning home     Patient was advised to follow up with recommended provider in 1 day for another evaluation and exam. I advised  patient/guardian to return or go to closest emergency room immediately if symptoms change, get worse, new symptoms develop prior to follow up. If there is no improvement in symptoms in the next 24 hours they are advised to return for further evaluation and exam. I also explained the plan and treatment course. Patient/guardian is in agreement with plan, treatment course, and follow up and states verbally that they will comply.    Patient is homegoing. I discussed the differential; results and discharge plan with the patient and/or family/friend/caregiver if present.  I emphasized the importance of follow-up with the physician I referred them to in the timeframe recommended.  I explained reasons for the patient to return to the Emergency Department. They agreed that if they feel their condition is worsening or if they have any other concern they should call 911 immediately for further assistance. I gave the patient an opportunity to ask all questions they had and answered all of them accordingly. They understand return precautions and discharge instructions. The patient and/or family/friend/caregiver expressed understanding verbally and that they would comply.        This note has been transcribed using voice recognition and may contain grammatical errors, misplaced words, incorrect words, incorrect phrases or other errors.        Procedure  Procedures     Jorge Luis Best PA-C  01/16/25 1577

## 2025-01-17 LAB — H PYLORI AG STL QL IA: NEGATIVE

## 2025-01-21 NOTE — PROGRESS NOTES
History Of Present Illness  Vivien Sheehan is a 44 y.o. female with h/o depression, chronic fatigue and GERD who is here as referred by her PCP, Dr. Hay for nausea.    Pt continues to take Prilosec and uses zofran as needed for her nausea.  H.pylori stool test was ordered and done on 1/15/25 and was negative.  Did she stop her Prilosec ?    Recent labs done on 12/2024 - fairly normal cmp,except for mild hyponatremia and mildly low t.protein and normal cbc.    Upon reviewing her EMR, it appears pt did see a GI specialist back in 2015. Celiac labs, EGD and Colonoscopy was ordered secondary to her GI concerns.    [facility] celiac plus test was negative (4/2015).  No record of EGD or Colonoscopy done in the past.         Past Medical History  She has no past medical history on file.    Surgical History  She has a past surgical history that includes Breast biopsy (N/A, 2012); Colposcopy (2003); and XR knee (1998).     Social History  She reports that she has never smoked. She has never used smokeless tobacco. She reports current alcohol use of about 2.0 standard drinks of alcohol per week. No history on file for drug use.    Family History  Family History   Problem Relation Name Age of Onset    Breast cancer Mother      Endometriosis Sister      Breast cancer Maternal Grandmother      Hypertension Maternal Grandfather      Ovarian cancer Paternal Grandmother      Pancreatic cancer Father's Brother          Allergies  Nickel, Sunscreen, Wool, Cat dander, Cobalt, Gluten protein, Grass pollen-june grass standard, and Pollen extracts      Review of Systems       There were no vitals taken for this visit.  Physical Exam           Relevant Results      Assessment/Plan:  {Assess/PlanSmartLinks:07976}    Shaista Hummel PA-C

## 2025-01-22 ENCOUNTER — APPOINTMENT (OUTPATIENT)
Dept: GASTROENTEROLOGY | Facility: HOSPITAL | Age: 45
End: 2025-01-22
Payer: COMMERCIAL

## 2025-01-23 ENCOUNTER — PATIENT MESSAGE (OUTPATIENT)
Dept: PRIMARY CARE | Facility: CLINIC | Age: 45
End: 2025-01-23
Payer: COMMERCIAL

## 2025-01-23 DIAGNOSIS — F33.41 RECURRENT MAJOR DEPRESSION IN PARTIAL REMISSION (CMS-HCC): Primary | ICD-10-CM

## 2025-01-23 RX ORDER — CITALOPRAM 20 MG/1
20 TABLET, FILM COATED ORAL DAILY
Qty: 90 TABLET | Refills: 3 | Status: SHIPPED | OUTPATIENT
Start: 2025-01-23

## 2025-01-29 NOTE — PROGRESS NOTES
Subjective   Patient ID: Vivien Sheehan is a 44 y.o. female who presents for Follow-up.    HPI   Depression. Added wellbutrin. Working with psychologist  Stamina?, how did she get through holidays. Feels olga and new years was functional.   Feels compulsion has decreased. Able to cope better. Life is better even with tough situations.   Hasn't seen therapist for 2 mos.   Recent bronchospasm.   Coughing so hard causing vomitting and cut down on breathing  Nausea. Rescheduling gi  obgyn    2 weeks ago, got out of car and hit head around frame of car.   Review of Systems    Objective   /58 (BP Location: Right arm, Patient Position: Sitting)   Pulse 108   Temp 36.3 °C (97.4 °F)   SpO2 98%     Physical Exam    Assessment/Plan 1. Recurrent major depression in partial remission (CMS-HCC) (Primary)  Improving with the wellbutrin! Some set backs with bronchitis and concussion, but overall, seems to be coping and less obsessing on things.    2. Other fatigue  As above.     3. Bronchitis  Resolved.   - azelastine (Astelin) 137 mcg (0.1 %) nasal spray; Administer 1 spray into each nostril once daily.  Dispense: 30 mL; Refill: 3    4. Concussion without loss of consciousness, subsequent encounter  Improving. Can take 4-6 weeks.   - meclizine (Antivert) 25 mg tablet; Take 1 tablet (25 mg) by mouth 3 times a day as needed for dizziness.  Dispense: 30 tablet; Refill: 11    5. Abnormal TSH  Will get repeat in march. Had tsh that was 5 at [facility].   - TSH; Future  - T4, free; Future    6. Constipation, unspecified constipation type  Consider miralax routinely, to see if helps chronic constipation and potential nausea issues while waiting for gi appt.

## 2025-01-30 ENCOUNTER — APPOINTMENT (OUTPATIENT)
Dept: PRIMARY CARE | Facility: CLINIC | Age: 45
End: 2025-01-30
Payer: COMMERCIAL

## 2025-01-30 VITALS
OXYGEN SATURATION: 98 % | SYSTOLIC BLOOD PRESSURE: 104 MMHG | DIASTOLIC BLOOD PRESSURE: 58 MMHG | TEMPERATURE: 97.4 F | HEART RATE: 108 BPM

## 2025-01-30 DIAGNOSIS — R53.83 OTHER FATIGUE: ICD-10-CM

## 2025-01-30 DIAGNOSIS — F33.41 RECURRENT MAJOR DEPRESSION IN PARTIAL REMISSION (CMS-HCC): Primary | ICD-10-CM

## 2025-01-30 DIAGNOSIS — S06.0X0D CONCUSSION WITHOUT LOSS OF CONSCIOUSNESS, SUBSEQUENT ENCOUNTER: ICD-10-CM

## 2025-01-30 DIAGNOSIS — J40 BRONCHITIS: ICD-10-CM

## 2025-01-30 DIAGNOSIS — K59.00 CONSTIPATION, UNSPECIFIED CONSTIPATION TYPE: ICD-10-CM

## 2025-01-30 DIAGNOSIS — R79.89 ABNORMAL TSH: ICD-10-CM

## 2025-01-30 PROCEDURE — 99215 OFFICE O/P EST HI 40 MIN: CPT | Performed by: INTERNAL MEDICINE

## 2025-01-30 PROCEDURE — 1036F TOBACCO NON-USER: CPT | Performed by: INTERNAL MEDICINE

## 2025-01-30 RX ORDER — MECLIZINE HYDROCHLORIDE 25 MG/1
25 TABLET ORAL 3 TIMES DAILY PRN
Qty: 30 TABLET | Refills: 11 | Status: SHIPPED | OUTPATIENT
Start: 2025-01-30 | End: 2026-01-30

## 2025-01-30 RX ORDER — AZELASTINE 1 MG/ML
1 SPRAY, METERED NASAL DAILY
Qty: 30 ML | Refills: 3 | Status: SHIPPED | OUTPATIENT
Start: 2025-01-30

## 2025-01-30 NOTE — PATIENT INSTRUCTIONS
Consider miralax routinely, to see if helps chronic constipation and potential nausea issues while waiting for gi appt.   Follow up beginning of April.

## 2025-02-21 ENCOUNTER — TELEPHONE (OUTPATIENT)
Dept: PRIMARY CARE | Facility: CLINIC | Age: 45
End: 2025-02-21
Payer: COMMERCIAL

## 2025-02-21 DIAGNOSIS — S06.0X0D CONCUSSION WITHOUT LOSS OF CONSCIOUSNESS, SUBSEQUENT ENCOUNTER: Primary | ICD-10-CM

## 2025-02-21 NOTE — TELEPHONE ENCOUNTER
Patient sent a [employer] message letting Dr. Hay know that she still having some issues since her concussion.  She would like to talk to a provider.    Patient's message:  Hi Dr. Hay,   I hope you're doing well. I'm still having concussion symptoms. I have a lot of neck pain and headaches and also I'm just not there cognitively. I can't multitask at all (this has always been easy for me), background noise makes it really hard for me to process what someone is saying, loud noises make me feel crazy and are painful and I'm just slower in general. Should I be doing something else? I know symptoms can last for a while and it's not an emergency but if there's something I should/could be doing to help this along, I just wanted to check.     Please call [phone]    Patient asked about vitamin preference, I spoke to patient and she said she can wait and ask  about the vitamin.

## 2025-03-04 ENCOUNTER — APPOINTMENT (OUTPATIENT)
Dept: PRIMARY CARE | Facility: CLINIC | Age: 45
End: 2025-03-04
Payer: COMMERCIAL

## 2025-03-04 ENCOUNTER — TELEPHONE (OUTPATIENT)
Dept: PRIMARY CARE | Facility: CLINIC | Age: 45
End: 2025-03-04

## 2025-03-04 VITALS
WEIGHT: 163 LBS | HEIGHT: 62 IN | DIASTOLIC BLOOD PRESSURE: 64 MMHG | TEMPERATURE: 98.1 F | HEART RATE: 76 BPM | SYSTOLIC BLOOD PRESSURE: 116 MMHG | OXYGEN SATURATION: 99 % | BODY MASS INDEX: 30 KG/M2

## 2025-03-04 DIAGNOSIS — S06.0X0D CONCUSSION WITHOUT LOSS OF CONSCIOUSNESS, SUBSEQUENT ENCOUNTER: ICD-10-CM

## 2025-03-04 PROCEDURE — 3008F BODY MASS INDEX DOCD: CPT | Performed by: INTERNAL MEDICINE

## 2025-03-04 PROCEDURE — 1036F TOBACCO NON-USER: CPT | Performed by: INTERNAL MEDICINE

## 2025-03-04 PROCEDURE — 99213 OFFICE O/P EST LOW 20 MIN: CPT | Performed by: INTERNAL MEDICINE

## 2025-03-04 ASSESSMENT — ENCOUNTER SYMPTOMS
FLANK PAIN: 0
PHOTOPHOBIA: 0
WOUND: 0
UNEXPECTED WEIGHT CHANGE: 0
VOICE CHANGE: 0
ABDOMINAL PAIN: 0
FEVER: 0
CONFUSION: 0
APPETITE CHANGE: 0
PALPITATIONS: 0
SORE THROAT: 0
HALLUCINATIONS: 0
ADENOPATHY: 0
FATIGUE: 0
JOINT SWELLING: 0
TROUBLE SWALLOWING: 0
HEMATURIA: 0
DYSURIA: 0
SPEECH DIFFICULTY: 0
DIZZINESS: 0
WHEEZING: 0
CHEST TIGHTNESS: 0
POLYDIPSIA: 0
ACTIVITY CHANGE: 0
BACK PAIN: 0
NUMBNESS: 0
NERVOUS/ANXIOUS: 0
FACIAL ASYMMETRY: 0
EYE PAIN: 0
COLOR CHANGE: 0
SHORTNESS OF BREATH: 0
COUGH: 0
MYALGIAS: 0
WEAKNESS: 0
CONSTIPATION: 0
VOMITING: 0
SEIZURES: 0
POLYPHAGIA: 0
SLEEP DISTURBANCE: 0
STRIDOR: 0
DECREASED CONCENTRATION: 1
TREMORS: 0
NAUSEA: 0
ARTHRALGIAS: 0
SINUS PAIN: 0
DIARRHEA: 0
BLOOD IN STOOL: 0

## 2025-03-04 NOTE — PATIENT INSTRUCTIONS
Physical exam is not suggestive of Nystagmus or any other motor neurological deficit.  Patient has been given excuse until     DO NOT INVOLVE IN ANY CONTACT SPORTS. DO NOT DO OFF ROAD DRIVING. BE CAREFUL WITH BUMPS ON THE ROADS.  Avoid sudden acceleration or deceleration as much as possible.  Do not work at height to avoid the risk of fall.  Avoid Alcohol/ Smoking/ Recreational drugs.  Avoid loud noises and bright fluctuating lights.  Stay hydrated.  Sleep for at least 7-8 hours a day.

## 2025-03-04 NOTE — LETTER
March 4, 2025     Patient: Vivien Sheehan   YOB: 1980   Date of Visit: 3/4/2025       To Whom It May Concern:    Vivien Sheehan was seen in my clinic on 3/4/2025 at 2:00 pm. Please excuse Vivien for her absence from work on this day to make the appointment. Patient is suffering from acute problem which can impair the focus and attention and need time to recover. Patient should be excused for 15 minutes after every 45 minute of work to relax and close eyes to help in healing. This is important with her line of work which need constant attention and focus.   The excuse is for 1 month from today. ( 3/4/2025). After 1 month I will assess the patient again for fitness to return to the work with no restriction.     If you have any questions or concerns, please don't hesitate to call.       Sincerely,         Tyree Henry MD        CC: No Recipients

## 2025-03-04 NOTE — PROGRESS NOTES
Subjective   Patient ID: Vivien Sheehan is a 44 y.o. female who presents for Concussion (January 14th hit head on the top of the door frame of the car, no loss of consciousness. Hard time focusing, trouble processing information. Neck and shoulder pain. Noise sensitivity. ).    Concussion   Associated symptoms include headaches. Pertinent negatives include no numbness, vomiting or weakness.   Patient had an event on 1/14/2025 when she slipped on ice and fell down hitting her head on the side of her car. She did not suffer LOC. Since then she is having headache which is gradually worsening and that make her go to ER 2 days later on 1/16/2025.  She was evaluated in the ER. NIH score was 0. CT scan of head and cervical spine was negative for acute problems. Cervical spine suggestive of DJD. In the ER she was given Zofran, Meclizine, Tylenol and Toradol. She noticed an improvement in her symptoms after the medication was administered.  She was sent for the concussion evaluation. Her symptoms are still present.     Review of Systems   Constitutional:  Negative for activity change, appetite change, fatigue, fever and unexpected weight change.   HENT:  Negative for dental problem, ear discharge, hearing loss, nosebleeds, postnasal drip, sinus pain, sore throat, trouble swallowing and voice change.    Eyes:  Negative for photophobia, pain and visual disturbance.   Respiratory:  Negative for cough, chest tightness, shortness of breath, wheezing and stridor.    Cardiovascular:  Negative for chest pain, palpitations and leg swelling.   Gastrointestinal:  Negative for abdominal pain, blood in stool, constipation, diarrhea, nausea and vomiting.   Endocrine: Negative for polydipsia, polyphagia and polyuria.   Genitourinary:  Negative for decreased urine volume, dyspareunia, dysuria, flank pain, hematuria and urgency.   Musculoskeletal:  Positive for neck pain and neck stiffness. Negative for arthralgias, back pain, gait  "problem, joint swelling and myalgias.   Skin:  Negative for color change, rash and wound.   Allergic/Immunologic: Negative for environmental allergies and food allergies.   Neurological:  Positive for headaches. Negative for dizziness, tremors, seizures, syncope, facial asymmetry, speech difficulty, weakness and numbness.   Hematological:  Negative for adenopathy.   Psychiatric/Behavioral:  Positive for decreased concentration. Negative for behavioral problems, confusion, hallucinations, self-injury, sleep disturbance and suicidal ideas. The patient is not nervous/anxious.      Objective   /64   Pulse 76   Temp 36.7 °C (98.1 °F)   Ht 1.575 m (5' 2\")   Wt 73.9 kg (163 lb)   SpO2 99%   BMI 29.81 kg/m²     Physical Exam  Vitals and nursing note reviewed.   Constitutional:       General: She is not in acute distress.     Appearance: Normal appearance. She is not ill-appearing or toxic-appearing.   HENT:      Head: Normocephalic and atraumatic.      Nose: Nose normal.   Eyes:      Extraocular Movements: Extraocular movements intact.      Conjunctiva/sclera: Conjunctivae normal.      Pupils: Pupils are equal, round, and reactive to light.   Cardiovascular:      Rate and Rhythm: Normal rate and regular rhythm.      Pulses: Normal pulses.      Heart sounds: Normal heart sounds.   Pulmonary:      Effort: Pulmonary effort is normal. No respiratory distress.      Breath sounds: Normal breath sounds. No stridor. No wheezing, rhonchi or rales.   Musculoskeletal:         General: No swelling or deformity. Normal range of motion.      Cervical back: Normal range of motion and neck supple. No rigidity or tenderness.   Skin:     General: Skin is warm.      Coloration: Skin is not jaundiced.      Findings: No bruising, erythema or rash.   Neurological:      General: No focal deficit present.      Mental Status: She is alert and oriented to person, place, and time.      Cranial Nerves: No cranial nerve deficit.      Motor: " No weakness.      Coordination: Coordination normal.      Gait: Gait normal.      Deep Tendon Reflexes: Reflexes normal.   Psychiatric:         Mood and Affect: Mood normal.         Behavior: Behavior normal.         Thought Content: Thought content normal.         Judgment: Judgment normal.       Assessment/Plan   Problem List Items Addressed This Visit       Concussion with no loss of consciousness     Physical exam is not suggestive of Nystagmus or any other motor neurological deficit.    DO NOT INVOLVE IN ANY CONTACT SPORTS. DO NOT DO OFF ROAD DRIVING. BE CAREFUL WITH BUMPS ON THE ROADS.  Avoid sudden acceleration or deceleration as much as possible.  Do not work at height to avoid the risk of fall.  Avoid Alcohol/ Smoking/ Recreational drugs.  Avoid loud noises and bright fluctuating lights.  Adequate hydration and drink plenty of water.  Sleep hygiene and sleep at least for 7- 8 hours a day.         Relevant Medications    SUMAtriptan (Imitrex) 100 mg tablet

## 2025-03-10 PROBLEM — S06.0X0A CONCUSSION WITH NO LOSS OF CONSCIOUSNESS: Status: ACTIVE | Noted: 2025-03-10

## 2025-03-10 RX ORDER — SUMATRIPTAN SUCCINATE 100 MG/1
100 TABLET ORAL ONCE AS NEEDED
Qty: 14 TABLET | Refills: 0 | Status: SHIPPED | OUTPATIENT
Start: 2025-03-10 | End: 2025-03-24

## 2025-03-10 ASSESSMENT — ENCOUNTER SYMPTOMS
NECK PAIN: 1
HEADACHES: 1
NECK STIFFNESS: 1

## 2025-03-10 NOTE — ASSESSMENT & PLAN NOTE
Physical exam is not suggestive of Nystagmus or any other motor neurological deficit.    DO NOT INVOLVE IN ANY CONTACT SPORTS. DO NOT DO OFF ROAD DRIVING. BE CAREFUL WITH BUMPS ON THE ROADS.  Avoid sudden acceleration or deceleration as much as possible.  Do not work at height to avoid the risk of fall.  Avoid Alcohol/ Smoking/ Recreational drugs.  Avoid loud noises and bright fluctuating lights.  Adequate hydration and drink plenty of water.  Sleep hygiene and sleep at least for 7- 8 hours a day.

## 2025-03-14 DIAGNOSIS — R79.89 ABNORMAL TSH: ICD-10-CM

## 2025-03-17 ENCOUNTER — TELEPHONE (OUTPATIENT)
Dept: PRIMARY CARE | Facility: CLINIC | Age: 45
End: 2025-03-17
Payer: COMMERCIAL

## 2025-03-19 ENCOUNTER — APPOINTMENT (OUTPATIENT)
Dept: GASTROENTEROLOGY | Facility: HOSPITAL | Age: 45
End: 2025-03-19
Payer: COMMERCIAL

## 2025-03-19 NOTE — PROGRESS NOTES
History Of Present Illness  Vivien Sheehan is a 44 y.o. female with h/o depression, chronic fatigue and GERD who is here as referred by her PCP,  for nausea and constipation.     Pt continues to take Prilosec and uses zofran as needed for her nausea.  H.pylori stool test was ordered and done on 1/15/25 and was negative.  Did she stop her Prilosec ?  Did she take Miralax ?     Labs done on 1/2025 -  TSH - 5.01 (high), T4 - 0.9 (normal).   Normal TTG IgA and TTG IgG.  Labs done on 12/2024 - fairly normal cmp,except for mild hyponatremia and mildly low t.protein and normal cbc.     Upon reviewing her EMR, it appears pt did see a GI specialist back in 2015. Celiac labs, EGD and Colonoscopy was ordered secondary to her GI concerns.    [facility] celiac plus test was negative (4/2015).  No record of EGD or Colonoscopy done in the past.           Past Medical History  She has a past medical history of Allergic (1994), Anemia (5/10), Anxiety (1996), Asthma (1993), Depression (1996), Eczema (2010), Headache (childhood), and Seizures (Multi) (1980).    Surgical History  She has a past surgical history that includes Breast biopsy (N/A, 2012); Colposcopy (2003); and XR knee (1998).     Social History  She reports that she has never smoked. She has never used smokeless tobacco. She reports current alcohol use of about 2.0 standard drinks of alcohol per week. She reports that she does not use drugs.    Family History  Family History   Problem Relation Name Age of Onset    Breast cancer Mother Pam Sheehan     Osteoporosis Mother Pam Sheehan     Arthritis Mother Pam Sheehan     Genetic Testing Mother Pam Sheehan     Hypertension Mother Pam Sheehan     Endometriosis Sister      Pancreatic cancer Father's Brother      Breast cancer Maternal Grandmother Grandma Ana     Arthritis Maternal Grandmother Grandma Ana     Hypertension Maternal Grandfather Grandpa Jack     Arthritis Maternal Grandfather Grandpa Jack      Cancer Maternal Grandfather Grandpa Xavier     Clotting disorder Maternal Grandfather Grandpa Xavier     Depression Maternal Grandfather Grandpa Xavier     Ovarian cancer Paternal Grandmother Grandma Aguila     Alcohol abuse Paternal Grandmother Grandma Aguila     Cancer Paternal Grandmother Grandma Aguila     Miscarriages / Stillbirths Paternal Grandmother Grandma Aguila     Alcohol abuse Father Mark Aguila     Depression Father Mark Sheehan     Genetic Testing Father Mark Sheehan     Hearing loss Father Mark Sheehan     Vision loss Paternal Grandfather Grandpa Aguila     Alcohol abuse Father's Brother Uncle Handy Aguila     Arthritis Mother's Sister Aunt Vinay Alana     Arthritis Mother's Sister Aunt Evie Tommie     Breast cancer Mother's Sister Aunt Evie Tommie     Depression Mother's Sister Aunt Evie Tommie     Hypertension Mother's Sister Aunt Evie Tommie     Miscarriages / Stillbirths Mother's Sister Aunt Evie Tommie     Asthma Son David Mo     Breast cancer Sister Thomas Sheehan     Genetic Testing Sister Thomas Sheehan     Miscarriages / Stillbirths Sister Thomas Sheehan     Cancer Father's Brother Uncle Simone Sheehan         Allergies  Nickel, Sunscreen, Wool, Cat dander, Cobalt, Gluten protein, Grass pollen-june grass standard, and Pollen extracts      Review of Systems       There were no vitals taken for this visit.  Physical Exam           Relevant Results      Assessment/Plan:  {Assess/PlanSmartLinks:68797}    Shaista Hummel PA-C

## 2025-04-02 ENCOUNTER — APPOINTMENT (OUTPATIENT)
Dept: PRIMARY CARE | Facility: CLINIC | Age: 45
End: 2025-04-02
Payer: COMMERCIAL

## 2025-04-02 VITALS
SYSTOLIC BLOOD PRESSURE: 100 MMHG | OXYGEN SATURATION: 99 % | BODY MASS INDEX: 29.63 KG/M2 | WEIGHT: 162 LBS | TEMPERATURE: 97.6 F | HEART RATE: 96 BPM | DIASTOLIC BLOOD PRESSURE: 62 MMHG

## 2025-04-02 DIAGNOSIS — S06.0XAD CONCUSSION WITH UNKNOWN LOSS OF CONSCIOUSNESS STATUS, SUBSEQUENT ENCOUNTER: ICD-10-CM

## 2025-04-02 DIAGNOSIS — L50.1 ACUTE IDIOPATHIC URTICARIA: Primary | ICD-10-CM

## 2025-04-02 DIAGNOSIS — F33.41 RECURRENT MAJOR DEPRESSION IN PARTIAL REMISSION (CMS-HCC): ICD-10-CM

## 2025-04-02 PROCEDURE — 99214 OFFICE O/P EST MOD 30 MIN: CPT | Performed by: INTERNAL MEDICINE

## 2025-04-02 RX ORDER — MULTIVITAMIN/IRON/FOLIC ACID 18MG-0.4MG
1 TABLET ORAL DAILY
COMMUNITY

## 2025-04-02 RX ORDER — HYDROXYZINE HYDROCHLORIDE 50 MG/1
50 TABLET, FILM COATED ORAL AS NEEDED
Qty: 30 TABLET | Refills: 1 | Status: SHIPPED | OUTPATIENT
Start: 2025-04-02

## 2025-04-02 NOTE — PROGRESS NOTES
Subjective   Patient ID: Vivien Sheehan is a 44 y.o. female who presents for Follow-up.    HPI   Recent flushing/burning feeling of skin.   Started in February and here is the first note to me  Hi Dr. Hay,  I hope you are well. I’ve had 4 really bad reactions w/the first one starting on February 24. They’ve gotten progressively worse when they do happen. It feels like my skin is burning from the inside out & it gets so tight that it’s hard to move my arms. My face feels like I have the worst sunburn I’ve ever had in my life. It is extremely painful & burns really badly. It does not itch. It’s hot and horribly painful like I burnt my whole upper body w/a toaster or curling iron. Each time it’s happened it’s taken longer to calm down & again the pain and spread of it has gotten worse. The photos I’ve attached are of two different incidents. First one is the one I had today & the other two are from prior reactions. I sometimes just get sensitive so at first I didn’t think a lot of of it, but now with the spread and pain and how it presents is totally different. I cannot find any common thread and it is not like any allergic or sensitivity reaction I’ve had. Today I poured milk all over myself & had to put ice packs all over my body because it felt like my skin was going to burn up. Do you have any ideas? It’s not like this is happening all the time, but it’s getting worse and today caused me such severe pain, I was completely incapacitated. I cried, which never happens w/pain & it took over an hour to become bearable. If you could let me know what you think, I’d really appreciate it.  I'm supposed to leave for vacation on Thurs.I hope you   3/17 started steroid at that time.   Has been using benadryl. Tried vistaril twice.   Blood work done. Cbc and cmp no major issues. Multiple pics sent in. See chart.     Since starting steroids its been a little better.   3 times was sitting in the sun(in car as well) no  other symptoms other than the skin.     Review of Systems    Objective   /62 (BP Location: Right arm, Patient Position: Sitting)   Pulse 96   Temp 36.4 °C (97.6 °F)   Wt 73.5 kg (162 lb)   SpO2 99%   BMI 29.63 kg/m²     Physical Exam    Assessment/Plan   1. Acute idiopathic urticaria (Primary)  I feel this is a type of urticaria. We have not found a cause. I am checking a few other blood tests to help me and the allergist rule out some causes  For now, let me know how much benadryl you are taking at night. Consider split dose doing one in am and one in night. Ok to take an extra vistaril if needed as well.   Document daily when they occur and rank 1-10 for severity using the initial bad one that was around march 15th as 10/10.   Start pepcid twice daily. We will determine benadryl and dosing once you get back to me.   - Juvencio-Barr virus VCA antibody panel; Future  - Hepatitis panel, acute; Future  - Sedimentation Rate; Future  - C-reactive protein; Future  - Juvencio-Barr virus VCA antibody panel  - Hepatitis panel, acute  - Sedimentation Rate  - C-reactive protein  - CBC and Auto Differential; Future  - CBC and Auto Differential    2. Feels concussion is better but still vague.      3. Wellbutrin doing well.

## 2025-04-02 NOTE — PATIENT INSTRUCTIONS
I feel this is a type of urticaria. We have not found a cause. I am checking a few other blood tests to help me and the allergist rule out some causes  For now, let me know how much benadryl you are taking at night. Consider split dose doing one in am and one in night. Ok to take an extra vistaril if needed as well.   Document daily when they occur and rank 1-10 for severity using the initial bad one that was around march 15th as 10/10.   Start pepcid twice daily. We will determine benadryl and dosing once you get back to me.

## 2025-04-04 ENCOUNTER — APPOINTMENT (OUTPATIENT)
Dept: PRIMARY CARE | Facility: CLINIC | Age: 45
End: 2025-04-04
Payer: COMMERCIAL

## 2025-04-09 LAB
BASOPHILS # BLD AUTO: 42 CELLS/UL (ref 0–200)
BASOPHILS NFR BLD AUTO: 1.1 %
CRP SERPL-MCNC: <3 MG/L
EBV NA IGG SER IA-ACNC: >600 U/ML
EBV VCA IGG SER IA-ACNC: 68.5 U/ML
EBV VCA IGM SER IA-ACNC: 53.9 U/ML
EOSINOPHIL # BLD AUTO: 99 CELLS/UL (ref 15–500)
EOSINOPHIL NFR BLD AUTO: 2.6 %
ERYTHROCYTE [DISTWIDTH] IN BLOOD BY AUTOMATED COUNT: 12.2 % (ref 11–15)
ERYTHROCYTE [SEDIMENTATION RATE] IN BLOOD BY WESTERGREN METHOD: 11 MM/H
HAV IGM SERPL QL IA: NORMAL
HBV CORE IGM SERPL QL IA: NORMAL
HBV SURFACE AG SERPL QL IA: NORMAL
HCT VFR BLD AUTO: 41 % (ref 35–45)
HCV AB SERPL QL IA: NORMAL
HGB BLD-MCNC: 13.3 G/DL (ref 11.7–15.5)
LYMPHOCYTES # BLD AUTO: 1144 CELLS/UL (ref 850–3900)
LYMPHOCYTES NFR BLD AUTO: 30.1 %
MCH RBC QN AUTO: 30.8 PG (ref 27–33)
MCHC RBC AUTO-ENTMCNC: 32.4 G/DL (ref 32–36)
MCV RBC AUTO: 94.9 FL (ref 80–100)
MONOCYTES # BLD AUTO: 331 CELLS/UL (ref 200–950)
MONOCYTES NFR BLD AUTO: 8.7 %
NEUTROPHILS # BLD AUTO: 2185 CELLS/UL (ref 1500–7800)
NEUTROPHILS NFR BLD AUTO: 57.5 %
PLATELET # BLD AUTO: 207 THOUSAND/UL (ref 140–400)
PMV BLD REES-ECKER: 9.8 FL (ref 7.5–12.5)
QUEST EBV PANEL INTERPRETATION:: ABNORMAL
RBC # BLD AUTO: 4.32 MILLION/UL (ref 3.8–5.1)
WBC # BLD AUTO: 3.8 THOUSAND/UL (ref 3.8–10.8)

## 2025-04-11 DIAGNOSIS — D89.49 OTHER MAST CELL ACTIVATION DISORDER: Primary | ICD-10-CM

## 2025-04-11 DIAGNOSIS — R23.2 FLUSH: ICD-10-CM

## 2025-05-01 DIAGNOSIS — F33.41 RECURRENT MAJOR DEPRESSION IN PARTIAL REMISSION (CMS-HCC): ICD-10-CM

## 2025-05-01 DIAGNOSIS — L50.1 ACUTE IDIOPATHIC URTICARIA: ICD-10-CM

## 2025-05-01 RX ORDER — HYDROXYZINE HYDROCHLORIDE 50 MG/1
50 TABLET, FILM COATED ORAL AS NEEDED
Qty: 90 TABLET | Refills: 1 | Status: SHIPPED | OUTPATIENT
Start: 2025-05-01

## 2025-05-07 ENCOUNTER — APPOINTMENT (OUTPATIENT)
Dept: PRIMARY CARE | Facility: CLINIC | Age: 45
End: 2025-05-07
Payer: COMMERCIAL

## 2025-05-14 DIAGNOSIS — R23.2 FLUSHING: Primary | ICD-10-CM

## 2025-05-21 ENCOUNTER — APPOINTMENT (OUTPATIENT)
Dept: GASTROENTEROLOGY | Facility: HOSPITAL | Age: 45
End: 2025-05-21
Payer: COMMERCIAL

## 2025-05-21 NOTE — PROGRESS NOTES
History Of Present Illness  Vivien Sheehan is a 45 y.o. female with h/o depression, chronic fatigue and GERD who is here as referred by her PCP,  for nausea and constipation.     Pt continues to take Prilosec and uses zofran as needed for her nausea.  H.pylori stool test was ordered and done on 1/15/25 and was negative.  Did she stop her Prilosec ?  Did she take Miralax as advised by her PCP?     Labs done on 1/2025 -  TSH - 5.01 (high), T4 - 0.9 (normal).   Normal TTG IgA and TTG IgG.  Labs done on 12/2024 - fairly normal cmp,except for mild hyponatremia and mildly low t.protein and normal cbc.     Upon reviewing her EMR, it appears pt did see a GI specialist back in 2015. Celiac labs, EGD and Colonoscopy was ordered secondary to her GI concerns.    [employer] celiac plus test was negative (4/2015).  No record of EGD or Colonoscopy done in the past.           Past Medical History  She has a past medical history of Allergic (1994), Anemia (5/10), Anxiety (1996), Asthma (1993), Depression (1996), Eczema (2010), Headache (childhood), and Seizures (Multi) (1980).    Surgical History  She has a past surgical history that includes Breast biopsy (N/A, 2012); Colposcopy (2003); and XR knee (1998).     Social History  She reports that she has never smoked. She has never used smokeless tobacco. She reports current alcohol use of about 2.0 standard drinks of alcohol per week. She reports that she does not use drugs.    Family History  Family History[1]     Allergies  Nickel, Sunscreen, Wool, Cat dander, Cobalt, Gluten protein, Grass pollen-june grass standard, and Pollen extracts      Review of Systems       There were no vitals taken for this visit.  Physical Exam           Relevant Results      Assessment/Plan:  {Assess/PlanSmartLinks:88155}    Shaista Hummel PA-C       [1]   Family History  Problem Relation Name Age of Onset    Breast cancer Mother Pam Sheehan     Osteoporosis Mother Pam Sheehan      Arthritis Mother Pam Larkinoon     Genetic Testing Mother Pam Aguila     Hypertension Mother Pam Aguila     Endometriosis Sister      Pancreatic cancer Father's Brother      Breast cancer Maternal Grandmother Grandma Ana     Arthritis Maternal Grandmother Grandma Ana     Hypertension Maternal Grandfather Grandpa Xavier     Arthritis Maternal Grandfather Grandpa Xavier     Cancer Maternal Grandfather Grandpa Xavier     Clotting disorder Maternal Grandfather Grandpa Xavier     Depression Maternal Grandfather Grandpa Xavier     Ovarian cancer Paternal Grandmother Grandma Aguila     Alcohol abuse Paternal Grandmother Grandma Aguila     Cancer Paternal Grandmother Grandma Aguila     Miscarriages / Stillbirths Paternal Grandmother Grandma Aguila     Alcohol abuse Father Mark Aguila     Depression Father Mark Aguila     Genetic Testing Father Mark Aguila     Hearing loss Father Mark Aguila     Vision loss Paternal Grandfather Grandpa Aguila     Alcohol abuse Father's Brother Uncle Handy Aguila     Arthritis Mother's Sister Aunt Vinay Alana     Arthritis Mother's Sister Aunt Evie Tommie     Breast cancer Mother's Sister Aunt Evie Tommie     Depression Mother's Sister Aunt Evie Tommie     Hypertension Mother's Sister Aunt Evie Tommie     Miscarriages / Stillbirths Mother's Sister Aunt Evie Tommie     Asthma Son David Florence     Breast cancer Sister Thomas Sheehan     Genetic Testing Sister Thomas Sheehan     Miscarriages / Stillbirths Sister Thomas Aguila     Cancer Father's Brother Uncle Simone Sheehan

## 2025-06-26 ENCOUNTER — TELEMEDICINE (OUTPATIENT)
Dept: PRIMARY CARE | Facility: CLINIC | Age: 45
End: 2025-06-26
Payer: COMMERCIAL

## 2025-06-26 DIAGNOSIS — I73.81 ERYTHROMELALGIA: Primary | ICD-10-CM

## 2025-06-26 DIAGNOSIS — R20.8 BURNING SENSATION: ICD-10-CM

## 2025-06-26 NOTE — PROGRESS NOTES
Subjective   With patient's permission, this is a Telemedicine visit with video and audio. The provider and patient participated in this telemedicine encounter.     Vivien Sheehan is a 45 y.o. female on telehealth visit for No chief complaint on file..  4/2/2025 visit    Recent flushing/burning feeling of skin.   Started in February and here is the first note to me  Hi Dr. Hay,  I hope you are well. I’ve had 4 really bad reactions w/the first one starting on February 24. They’ve gotten progressively worse when they do happen. It feels like my skin is burning from the inside out & it gets so tight that it’s hard to move my arms. My face feels like I have the worst sunburn I’ve ever had in my life. It is extremely painful & burns really badly. It does not itch. It’s hot and horribly painful like I burnt my whole upper body w/a toaster or curling iron. Each time it’s happened it’s taken longer to calm down & again the pain and spread of it has gotten worse. The photos I’ve attached are of two different incidents. First one is the one I had today & the other two are from prior reactions. I sometimes just get sensitive so at first I didn’t think a lot of of it, but now with the spread and pain and how it presents is totally different. I cannot find any common thread and it is not like any allergic or sensitivity reaction I’ve had. Today I poured milk all over myself & had to put ice packs all over my body because it felt like my skin was going to burn up. Do you have any ideas? It’s not like this is happening all the time, but it’s getting worse and today caused me such severe pain, I was completely incapacitated. I cried, which never happens w/pain & it took over an hour to become bearable. If you could let me know what you think, I’d really appreciate it.  I'm supposed to leave for vacation on Thurs.I hope you   3/17 started steroid at that time.   Has been using benadryl. Tried vistaril twice.   Blood work  done. Cbc and cmp no major issues. Multiple pics sent in. See chart.      Since starting steroids its been a little better.   3 times was sitting in the sun(in car as well) no other symptoms other than the skin.   4/10 oositive ebv.   4/11 Seen by allergist   Dr Sanz. Notes scaneed in. Flushing panels done.   Allergist wants her to see neurologist, vascular doc and derm. Thought to be erythromelagia  Erythromelalgia is a rare, acquired or (very rarely) inherited clinical syndrome of intermittently red, hot, painful extremities (picture 1A-G). The syndrome usually affects the lower extremities (predominantly the feet) but may also involve the upper extremities (predominantly hands) and rarely concomitantly involves the face. Patients instinctively try to relieve symptoms by cooling the involved areas with fans, cold water, or ice.   Possible tx per up to date.   Our approach -- Given the paucity of data on the management of erythromelalgia, various approaches to treatment are reasonable. Our typical approach for adults with erythromelalgia is as follows:  ?Step 1  Counseling regarding nonpharmacologic measures to minimize symptoms. (See 'Nonpharmacologic measures' above.)  Aspirin (325 mg per day, discontinue after one month if no response; aspirin may be particularly beneficial in patients with myeloproliferative disease).  Trial of topical therapies  -Compounded amitriptyline 2% combined with ketamine 0.5% in a Lipoderm base (applied three times daily as needed)  Or  -Compounded midodrine 0.2% cream (applied three times daily as needed)  Or  -Lidocaine patch (up to three patches applied to affected areas for 12 hours every 24-hour period) for symptoms involving the feet  Patients should be treated with topical therapies for at least two to four weeks to assess efficacy.  Treatment of underlying myeloproliferative disease.  ?Step 2 (patients who fail to respond adequately to step 1)  Initiation of other systemic  treatments  -Gabapentin (300 mg once daily at bedtime; may be titrated gradually up to a maximum of 2400 mg per day as needed and as tolerated)  Or  -Pregabalin (75 mg twice daily; may be titrated up to 300 mg twice daily as needed and as tolerated)  Or  -Venlafaxine (extended release, at a dose of 75 mg per day; can be gradually increased up to a maximum dose of 225 mg per day)  Patients should be treated with these agents for at least two to four months to assess efficacy.  Objective   There were no vitals taken for this visit.    Physical Exam    Looks good on video, no respiratory distress    1. Erythromelalgia (Primary)  Discussed next steps. I will work to [facility] vascular, neurology for potential consult. Interestingly, this all started in feb since having positive iGM and igg sana dean. There are some case reports of ebv causing. Will try daily asa and lidocaine topical that she has. Will keep diary . Stress can trigger. Cosnider gabapentin next step.     2. Burning sensation

## 2025-07-02 ENCOUNTER — APPOINTMENT (OUTPATIENT)
Dept: PRIMARY CARE | Facility: CLINIC | Age: 45
End: 2025-07-02
Payer: COMMERCIAL

## 2025-07-03 ENCOUNTER — APPOINTMENT (OUTPATIENT)
Dept: PRIMARY CARE | Facility: CLINIC | Age: 45
End: 2025-07-03
Payer: COMMERCIAL

## 2025-07-03 DIAGNOSIS — I73.81 ERYTHROMELALGIA: Primary | ICD-10-CM

## 2025-07-03 DIAGNOSIS — R20.8 BURNING SENSATION: ICD-10-CM

## 2025-07-03 DIAGNOSIS — F33.41 RECURRENT MAJOR DEPRESSION IN PARTIAL REMISSION: ICD-10-CM

## 2025-07-03 DIAGNOSIS — R53.83 OTHER FATIGUE: ICD-10-CM

## 2025-07-03 DIAGNOSIS — Z00.00 GENERAL MEDICAL EXAM: Primary | ICD-10-CM

## 2025-07-03 PROCEDURE — 99214 OFFICE O/P EST MOD 30 MIN: CPT | Performed by: INTERNAL MEDICINE

## 2025-07-03 NOTE — PATIENT INSTRUCTIONS
Aspirin daily and if you have an episode, notify me via mychart, take another aspirin that day, and try the lidocaine roller  Lets see neurology.   Consider cutting down celexa slightly as a potential next step. Maybe not.   PE beginning of October.

## 2025-07-03 NOTE — PROGRESS NOTES
Subjective   Patient ID: Vivien Sheehan is a 45 y.o. female who presents for No chief complaint on file..    HPI fu to telemed visit from last week wher we started asa 325 and lidocaine. Interestingly, was igm and igg positive for ebv in feb  Started in February and here is the first note to me  Hi Dr. Hay,  I hope you are well. I’ve had 4 really bad reactions w/the first one starting on February 24. They’ve gotten progressively worse when they do happen. It feels like my skin is burning from the inside out & it gets so tight that it’s hard to move my arms. My face feels like I have the worst sunburn I’ve ever had in my life. It is extremely painful & burns really badly. It does not itch. It’s hot and horribly painful like I burnt my whole upper body w/a toaster or curling iron. Each time it’s happened it’s taken longer to calm down & again the pain and spread of it has gotten worse. The photos I’ve attached are of two different incidents. First one is the one I had today & the other two are from prior reactions. I sometimes just get sensitive so at first I didn’t think a lot of of it, but now with the spread and pain and how it presents is totally different. I cannot find any common thread and it is not like any allergic or sensitivity reaction I’ve had. Today I poured milk all over myself & had to put ice packs all over my body because it felt like my skin was going to burn up. Do you have any ideas? It’s not like this is happening all the time, but it’s getting worse and today caused me such severe pain, I was completely incapacitated. I cried, which never happens w/pain & it took over an hour to become bearable. If you could let me know what you think, I’d really appreciate it.  I'm supposed to leave for vacation on Thurs.I hope you   3/17 started steroid at that time.   Has been using benadryl. Tried vistaril twice.   Blood work done. Cbc and cmp no major issues. Multiple pics sent in. See chart.       Since starting steroids its been a little better.   3 times was sitting in the sun(in car as well) no other symptoms other than the skin.   4/10 oositive ebv.   4/11 Seen by allergist   Dr Sanz. Notes scaneed in. Flushing panels done.   Allergist wants her to see neurologist, vascular doc and derm. Thought to be erythromelagia    Review of Systems    Objective   There were no vitals taken for this visit.    Physical Exam    Assessment/Plan   1. Erythromelalgia (Primary)  Aspirin daily and if you have an episode, notify me via mychart, take another aspirin that day, and try the lidocaine roller  Lets see neurology.   Consider cutting down celexa slightly as a potential next step. Maybe not.   PE beginning of October.   - Referral to Neurology; Future    2. Burning sensation    3. Recurrent major depression in partial remission    - Referral to [facility]; Future    4. Other fatigue    - Referral to [facility]; Future

## 2025-08-18 ENCOUNTER — APPOINTMENT (OUTPATIENT)
Dept: GASTROENTEROLOGY | Facility: HOSPITAL | Age: 45
End: 2025-08-18
Payer: COMMERCIAL

## 2025-10-09 ENCOUNTER — APPOINTMENT (OUTPATIENT)
Dept: PRIMARY CARE | Facility: CLINIC | Age: 45
End: 2025-10-09
Payer: COMMERCIAL

## 2025-11-03 ENCOUNTER — APPOINTMENT (OUTPATIENT)
Dept: GASTROENTEROLOGY | Facility: HOSPITAL | Age: 45
End: 2025-11-03
Payer: COMMERCIAL

## 2025-12-04 ENCOUNTER — APPOINTMENT (OUTPATIENT)
Dept: NEUROLOGY | Facility: CLINIC | Age: 45
End: 2025-12-04
Payer: COMMERCIAL